# Patient Record
Sex: FEMALE | Race: WHITE | URBAN - METROPOLITAN AREA
[De-identification: names, ages, dates, MRNs, and addresses within clinical notes are randomized per-mention and may not be internally consistent; named-entity substitution may affect disease eponyms.]

---

## 2021-07-23 ENCOUNTER — OFFICE VISIT (OUTPATIENT)
Dept: URBAN - METROPOLITAN AREA CLINIC 23 | Facility: CLINIC | Age: 66
End: 2021-07-23
Payer: MEDICARE

## 2021-07-23 DIAGNOSIS — H40.003 PREGLAUCOMA, UNSPECIFIED, BILATERAL: ICD-10-CM

## 2021-07-23 DIAGNOSIS — H33.322 ROUND HOLE OF RETINA OF LEFT EYE: Primary | ICD-10-CM

## 2021-07-23 PROCEDURE — 92134 CPTRZ OPH DX IMG PST SGM RTA: CPT | Performed by: OPHTHALMOLOGY

## 2021-07-23 PROCEDURE — 99204 OFFICE O/P NEW MOD 45 MIN: CPT | Performed by: OPHTHALMOLOGY

## 2021-07-23 ASSESSMENT — INTRAOCULAR PRESSURE
OS: 14
OD: 15

## 2021-07-23 ASSESSMENT — KERATOMETRY
OD: 43.75
OS: 44.13

## 2021-07-23 NOTE — IMPRESSION/PLAN
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 Left. Condition: new problem addtl w/u needed. Vision: vision not affected. Plan: Discussed diagnosis in detail with patient. Discussed risks of progression. Exam OS shows ? temporal retinal thinning, mod-large hole w/ mild fluid w/ pigmentary scar around the hole. OCT OS shows the macula is normal and Optos OS shows a small hole in the retina temporal at 3 o'clock. Recommend Laser treatment PCA LEFT EYE to repair the Retinal Tear and reduce the risk of Retinal Detachment. Discussed risks/benefits of laser TX. All questions answered. Patient elects to proceed with recommendation.

## 2021-07-23 NOTE — IMPRESSION/PLAN
Impression: Preglaucoma, unspecified, bilateral: H40.003. Bilateral. Condition: new problem addtl w/u needed. Vision: vision not affected. Plan: Advised patient of condition. Discussed diagnosis in detail with patient. Patient has a family history of glaucoma. Advised patient that although her IOP appears normal, based on her family history and asymmetrical cup-to-disc ratios OS >OD, it is recommended that she see a glaucoma specialist for further evaluation.

## 2021-08-05 ENCOUNTER — OFFICE VISIT (OUTPATIENT)
Dept: URBAN - METROPOLITAN AREA CLINIC 23 | Facility: CLINIC | Age: 66
End: 2021-08-05
Payer: MEDICARE

## 2021-08-05 DIAGNOSIS — H40.033 ANATOMICAL NARROW ANGLE, BILATERAL: Primary | ICD-10-CM

## 2021-08-05 DIAGNOSIS — H25.813 COMBINED FORMS OF AGE-RELATED CATARACT, BILATERAL: ICD-10-CM

## 2021-08-05 PROCEDURE — 76514 ECHO EXAM OF EYE THICKNESS: CPT | Performed by: OPHTHALMOLOGY

## 2021-08-05 PROCEDURE — 92133 CPTRZD OPH DX IMG PST SGM ON: CPT | Performed by: OPHTHALMOLOGY

## 2021-08-05 PROCEDURE — 92020 GONIOSCOPY: CPT | Performed by: OPHTHALMOLOGY

## 2021-08-05 PROCEDURE — 99212 OFFICE O/P EST SF 10 MIN: CPT | Performed by: OPHTHALMOLOGY

## 2021-08-05 ASSESSMENT — INTRAOCULAR PRESSURE
OD: 19
OS: 17

## 2021-08-05 NOTE — IMPRESSION/PLAN
Impression: Combined forms of age-related cataract, bilateral: H25.813. Plan: Discussed cataract diagnosis with the patient. Discussed risks, benefits and alternatives to surgery including but not limited to: bleeding, infection, risk of vision loss, loss of the eye, need for other surgery. Patient voiced understanding. Will continue to monitor.

## 2021-08-05 NOTE — IMPRESSION/PLAN
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 Left. Condition: new problem addtl w/u needed. Vision: vision not affected.  Plan: continue present management with Felicia Padilla

## 2021-08-05 NOTE — IMPRESSION/PLAN
Impression: Anatomical narrow angle, bilateral: H40.033. Plan: Pt has Glaucoma    Gonio : open to SS 3/4 CLEMENT temp Pachs: [phone] Today's IOP : 19/17        Tmax  :  
Target IOP low to mid teens Pt has Fhx of Glaucoma: MOTHER Right / Left eye is the better seeing eye (Last vf &date )
C/D:  .5/.6 OCT: 93/105 8/5/21 Pt denies Sulfa Allergy   // Pt denies Lung /Heart dx Plan :
1. Patient is not currently using drops. 2. no treatment is currently required, will continue to monitor.  
3. Return to clinic in 6 Months for IOP Check/VF

## 2021-08-31 ENCOUNTER — SURGERY (OUTPATIENT)
Dept: URBAN - METROPOLITAN AREA SURGERY 11 | Facility: SURGERY | Age: 66
End: 2021-08-31
Payer: MEDICARE

## 2021-08-31 PROCEDURE — 67145 PROPH RTA DTCHMNT PC: CPT | Performed by: OPHTHALMOLOGY

## 2021-09-07 ENCOUNTER — POST-OPERATIVE VISIT (OUTPATIENT)
Dept: URBAN - METROPOLITAN AREA CLINIC 23 | Facility: CLINIC | Age: 66
End: 2021-09-07

## 2021-09-07 DIAGNOSIS — Z48.810 ENCOUNTER FOR SURGICAL AFTERCARE FOLLOWING SURGERY ON A SENSE ORGAN: Primary | ICD-10-CM

## 2021-09-07 PROCEDURE — 99024 POSTOP FOLLOW-UP VISIT: CPT | Performed by: OPTOMETRIST

## 2021-09-07 ASSESSMENT — INTRAOCULAR PRESSURE
OS: 17
OD: 19

## 2021-09-07 NOTE — IMPRESSION/PLAN
Impression: S/P PCA (Photocoagulation for retinal tear laser) OS - 7 Days. Encounter for surgical aftercare following surgery on a sense organ  Z48.810. Excellent post op course   Post operative instructions reviewed - Condition is improving - Plan: pt edu. no complications. edu on all findings. continue with normal course of tx w/ dr Elkin Kirkland patient to use artificial tears for comfort.

## 2022-02-01 ENCOUNTER — OFFICE VISIT (OUTPATIENT)
Dept: URBAN - METROPOLITAN AREA CLINIC 23 | Facility: CLINIC | Age: 67
End: 2022-02-01
Payer: MEDICARE

## 2022-02-01 DIAGNOSIS — H40.013 OPEN ANGLE WITH BORDERLINE FINDINGS, LOW RISK, BILATERAL: ICD-10-CM

## 2022-02-01 PROCEDURE — 99213 OFFICE O/P EST LOW 20 MIN: CPT | Performed by: OPHTHALMOLOGY

## 2022-02-01 PROCEDURE — 92133 CPTRZD OPH DX IMG PST SGM ON: CPT | Performed by: OPHTHALMOLOGY

## 2022-02-01 PROCEDURE — 92083 EXTENDED VISUAL FIELD XM: CPT | Performed by: OPHTHALMOLOGY

## 2022-02-01 NOTE — IMPRESSION/PLAN
Impression: Open angle with borderline findings, low risk, bilateral: H40.013. Plan:  Gonio : open to SS 3/4 CLEMENT temp Pachs: 519/[phone] Today's IOP : 19/17        Tmax  : 19/17 Target IOP low to mid teens Pt has Fhx of Glaucoma: MOTHER Left eye is the better seeing eye (Last vf OU Non pattern loss 2/1/22 C/D:  .5/.6 OCT:  105/109 21/22 Pt denies Sulfa Allergy   // Pt denies Lung /Heart dx Plan :
1. Patient is not currently using drops. 2. no treatment is currently required 3.  Patient to resume annual care with Optometrist.

## 2023-02-01 ENCOUNTER — OFFICE VISIT (OUTPATIENT)
Dept: URBAN - METROPOLITAN AREA CLINIC 23 | Facility: CLINIC | Age: 68
End: 2023-02-01
Payer: MEDICARE

## 2023-02-01 DIAGNOSIS — H40.013 OPEN ANGLE WITH BORDERLINE FINDINGS, LOW RISK, BILATERAL: Primary | ICD-10-CM

## 2023-02-01 DIAGNOSIS — H33.322 ROUND HOLE OF RETINA OF LEFT EYE: ICD-10-CM

## 2023-02-01 PROCEDURE — 92133 CPTRZD OPH DX IMG PST SGM ON: CPT | Performed by: OPTOMETRIST

## 2023-02-01 PROCEDURE — 99213 OFFICE O/P EST LOW 20 MIN: CPT | Performed by: OPTOMETRIST

## 2023-02-01 ASSESSMENT — KERATOMETRY
OS: 44.25
OD: 43.75

## 2023-02-01 ASSESSMENT — VISUAL ACUITY
OS: 20/20
OD: 20/20

## 2023-02-01 ASSESSMENT — INTRAOCULAR PRESSURE
OD: 18
OS: 18

## 2023-02-01 NOTE — IMPRESSION/PLAN
Impression: Open angle with borderline findings, low risk, bilateral: H40.013. Bilateral. Condition: established, stable. Plan: Discussed findings, RNFL OCT ordered and reviewed. Stable large CD, no signs of glaucomatous damage at this time. No need to repeat testing yearly. Monitor annually, sooner with any vision changes.

## 2023-02-01 NOTE — IMPRESSION/PLAN
Impression: Round hole of retina of left eye: H33.322 Left. Condition: resolved. Plan: Previous retinal hole well repaired, no new RT/RD noted today. Take OPTOS photos next year to document repair.